# Patient Record
(demographics unavailable — no encounter records)

---

## 2024-11-14 NOTE — PLAN
[Individual In-School Counseling] : - Individual counseling weekly with school psychologist or  [ANALIA] : - Applied Behavior Analysis (ANALIA) therapy [Counseling] : Benefits and limits of counseling or therapy [Family Questions] : Family's questions were addressed [Media / Screen Time] : Importance of limiting electronics, media, and screen time [Reading] : Importance of daily reading

## 2024-11-14 NOTE — HISTORY OF PRESENT ILLNESS
[Public] : Public [ICT: _____] : Integrated Co-teaching class (Collaborative Team Teaching) [unfilled] [IEP] : Individualized Education Program [OT: ____] : Occupational Therapy [unfilled] [Counseling: _____] : Counseling [unfilled] [Other: ____] : [unfilled] [FreeTextEntry4] : [facility] in  [facility]. [TWNoteComboBox1] : 5th Grade [FreeTextEntry1] : Prabha is a 10 year old male with Autism and ADHD. His mother and grandmother returned to day to discuss his academic and social progress. Although they did not return with his recent academic progress report, they are concerned that his academic performance has been very poor. He also tends to display anger and outbursts when he is unable to have his way and  when he is unable to complete his school assignments. His mother receives complaints of his anger and outbursts from his  teachers at least once every 3 weeks, but complaints of his inattention and poor class participation are received very often. At home he tends to abandon homework assignments if he does not understand what he is required to do and does not request for assistance from his teacher or his mother and grandmother.  Prabha's  Grandmother believes that he has the potential for a good academic performance and achievement as she has seen him achieve a 100%  grade in an exam, and  on a subject that interests him.  She has consulted with an ANALIA therapy agency but a new referral  was needed. Plan: Prabha will continue with his current  educational services. Medication treatment options  for the improvement of his focus and attention difficulties were  discussed  but initiation of treatment was deferred by his mother. Counseling/ANALIA referral and resources were provided to prabha's parents as requested today. Prabha's mother and grandmother  may call with any concerns  and return  with him in 6-8 months.

## 2024-11-14 NOTE — REASON FOR VISIT
[Follow-Up Visit] : a follow-up visit for [ADHD] : ADHD [Autism Spectrum Disorder] : autism spectrum disorder [Learning Problems] : learning problems [Mother] : mother [Other: _____] : [unfilled]

## 2024-11-14 NOTE — PHYSICAL EXAM
[External ears normal] : external ears normal [Normal] : patient has a normal gait [Able to redirect] : able to redirect [Fidgets] : fidgets [Cooperative when examined] : cooperative when examined [Answered questions appropriately] : answered questions appropriately [Appropriate eye contact] : no appropriate eye contact [de-identified] : During the examination, he had inconsistent eye contact and frequently made noisy sounds with his  mouth.

## 2025-07-15 NOTE — ASSESSMENT
[FreeTextEntry1] : Food allergies (shellfish, tree nuts): Blood work for IgE to seafood and nuts.  Decide on EpiPen after results received.  Continue avoidance for now. Seasonal allergic rhinitis: Blood work will check environmental allergies.  Use oral antihistamine, Flonase and eyedrops in spring and fall. Will call with results, decide follow-up after blood work received.

## 2025-07-15 NOTE — HISTORY OF PRESENT ILLNESS
[de-identified] : In office with mother and maternal grandmother to be evaluated for food allergies and seasonal allergies.  Seasonal allergies: Symptoms in spring and fall for several years, consisting of stuffy nose, itchy eyes, sneezing, runny nose and sometimes mild shortness of breath from the nose.  Usually takes Claritin or Allegra and Benadryl.  Also used nasal saline and Flonase in the past, as well as eyedrops.  Now feeling well, no medications. No frequent antibiotics.  No need for nebulizer.  Foods: In early childhood he had lobster sauce with rice and shrimp and he had a rash on the abdomen and itchy skin.  He had no shortness of breath or vomiting.  Symptoms cleared with Benadryl.  He tolerates fish but avoids shellfish.  He never tried mollusks.  No EpiPen. He had peanut at 5 years old and come plaint of mouth burning.  He now tolerates peanuts but complains of burning in the mouth with walnut and pecan and itchy mouth with pistachio.  No EpiPen at hand.  He was not allowed to go on a school trip because he has food allergies and no EpiPen. He had blood work from primary care physician about 1 year ago, results not available.  It showed IgE to multiple foods including egg.  He tolerates eggs but he had vomiting once after [place] up egg.

## 2025-07-15 NOTE — REASON FOR VISIT
[Evaluation/Consultation] : an evaluation/consultation of [Allergic Rhinitis] : allergic rhinitis [To Food] : allergy to food [Patient] : patient [Mother] : mother [Family Member] : family member

## 2025-07-15 NOTE — SOCIAL HISTORY
[de-identified] : House with oil forced air heating, room air conditioning, no carpet in the bedroom, no pets, no cigarette smoke exposure.

## 2025-07-15 NOTE — PHYSICAL EXAM
[Alert] : alert [No Acute Distress] : no acute distress [Normal Voice/Communication] : normal voice communication [Supple] : the neck was supple [Normal S1, S2] : normal S1 and S2 [Regular Rhythm] : with a regular rhythm [Normal Cervical Lymph Nodes] : cervical [Normal Axillary Lumph Nodes] : axillary [Skin Intact] : skin intact  [No Rash] : no rash [No clubbing] : no clubbing [No Cyanosis] : no cyanosis [Alert, Awake, Oriented as Age-Appropriate] : alert, awake, oriented as age appropriate [de-identified] : Eyes clear. [de-identified] : Throat clear.  Nasal mucosa pink, no stuffiness or discharge.  Tympanic membranes normal.  No sinus tenderness. [de-identified] : Chest clear, good air entry, no wheezing or crackles. [de-identified] : Abdomen soft, not tender, no organomegaly.